# Patient Record
Sex: MALE | Race: BLACK OR AFRICAN AMERICAN | NOT HISPANIC OR LATINO | Employment: FULL TIME | URBAN - METROPOLITAN AREA
[De-identification: names, ages, dates, MRNs, and addresses within clinical notes are randomized per-mention and may not be internally consistent; named-entity substitution may affect disease eponyms.]

---

## 2024-04-13 ENCOUNTER — HOSPITAL ENCOUNTER (EMERGENCY)
Facility: CLINIC | Age: 39
Discharge: HOME | End: 2024-04-13
Attending: EMERGENCY MEDICINE | Admitting: EMERGENCY MEDICINE

## 2024-04-13 VITALS
DIASTOLIC BLOOD PRESSURE: 81 MMHG | HEART RATE: 91 BPM | WEIGHT: 240 LBS | SYSTOLIC BLOOD PRESSURE: 160 MMHG | TEMPERATURE: 98 F | OXYGEN SATURATION: 96 % | RESPIRATION RATE: 16 BRPM

## 2024-04-13 DIAGNOSIS — H00.011 HORDEOLUM EXTERNUM OF RIGHT UPPER EYELID: ICD-10-CM

## 2024-04-13 DIAGNOSIS — H10.9 CONJUNCTIVITIS OF BOTH EYES: ICD-10-CM

## 2024-04-13 DIAGNOSIS — H57.13 EYE PAIN, BILATERAL: ICD-10-CM

## 2024-04-13 PROCEDURE — 250N000009 HC RX 250: Performed by: BEHAVIOR TECHNICIAN

## 2024-04-13 PROCEDURE — 99284 EMERGENCY DEPT VISIT MOD MDM: CPT

## 2024-04-13 RX ORDER — OFLOXACIN 3 MG/ML
1-2 SOLUTION/ DROPS OPHTHALMIC
Qty: 5 ML | Refills: 0 | Status: SHIPPED | OUTPATIENT
Start: 2024-04-13

## 2024-04-13 RX ORDER — KETOROLAC TROMETHAMINE 5 MG/ML
1 SOLUTION OPHTHALMIC 4 TIMES DAILY
Qty: 5 ML | Refills: 0 | Status: SHIPPED | OUTPATIENT
Start: 2024-04-13

## 2024-04-13 RX ORDER — TETRACAINE HYDROCHLORIDE 5 MG/ML
1-2 SOLUTION OPHTHALMIC ONCE
Status: COMPLETED | OUTPATIENT
Start: 2024-04-13 | End: 2024-04-13

## 2024-04-13 RX ADMIN — FLUORESCEIN SODIUM 1 STRIP: 1 STRIP OPHTHALMIC at 16:42

## 2024-04-13 RX ADMIN — TETRACAINE HYDROCHLORIDE 2 DROP: 5 SOLUTION OPHTHALMIC at 16:42

## 2024-04-13 ASSESSMENT — ACTIVITIES OF DAILY LIVING (ADL)
ADLS_ACUITY_SCORE: 35

## 2024-04-13 ASSESSMENT — VISUAL ACUITY
OS: 20/20;WITHOUT CORRECTIVE LENSES
OD: 20/30;WITHOUT CORRECTIVE LENSES
OU: 20/25;WITHOUT CORRECTIVE LENSES
OU: NORMAL

## 2024-04-13 ASSESSMENT — COLUMBIA-SUICIDE SEVERITY RATING SCALE - C-SSRS
2. HAVE YOU ACTUALLY HAD ANY THOUGHTS OF KILLING YOURSELF IN THE PAST MONTH?: NO
6. HAVE YOU EVER DONE ANYTHING, STARTED TO DO ANYTHING, OR PREPARED TO DO ANYTHING TO END YOUR LIFE?: NO
1. IN THE PAST MONTH, HAVE YOU WISHED YOU WERE DEAD OR WISHED YOU COULD GO TO SLEEP AND NOT WAKE UP?: NO

## 2024-04-13 NOTE — ED PROVIDER NOTES
ED ATTENDING PHYSICIAN NOTE:   I evaluated this patient in conjunction with mere romero I have participated in the care of the patient and personally performed key elements of the history, exam, and medical decision making.      HPI:   Gabriel Caldera is a 38 year old male who presents with bilateral eye discomfort and redness he was seen in urgent care about 2 weeks ago and started on Polytrim for conjunctivitis he does not feel much better and now he feels a lump under his right eyelid no fevers chills or active drainage    Independent Historian:   Patient only    Review of External Notes: Urgent care notes     EXAM:   General: Black male sitting no respiratory distress  Head and neck exam: Mild swelling of both upper lids with palpebral conjunctival inflammation.  Small pea size lesion felt under right upper lid.  Minimal conjunctival inflammation.  Extraocular movements intact.  No significant bulbar conjunctival inflammation.  Anterior chamber appears clear.  No foreign body or abrasion seen under slit-lamp    Independent Interpretation (X-rays, CTs, rhythm strip):  None    Consultations/Discussion of Management or Tests:  None    Social Determinants of Health affecting care:   None     MEDICAL DECISION MAKING/ASSESSMENT AND PLAN:   Patient has swelling of both upper eyelids with mild conjunctival inflammation after being on Polytrim he may be having a reaction to Polytrim.  In addition he is probably developing a stye under the right eyelid.  Patient will be placed on fluoroquinolone antibiotic drops given nonsteroidal drops, will apply warm compresses and will follow-up with ophthalmology in the next week.     DIAGNOSIS:     ICD-10-CM    1. Eye pain, bilateral  H57.13       2. Hordeolum externum of right upper eyelid  H00.011       3. Conjunctivitis of both eyes  H10.9                DISPOSITION:   Discharge     Javon Avalos MD  4/13/2024  [facility] EMERGENCY DEPT      [place]iley  Javon Peña MD  04/13/24 2001

## 2024-04-13 NOTE — LETTER
April 13, 2024      To Whom It May Concern:      Gabriel Caldera was seen in our Emergency Department today, 04/13/24.  I expect his condition to improve over the next 3 days.  He may return to work/school 4/17/24 or when improved.    Sincerely,        Carols Cox RN

## 2024-04-13 NOTE — ED PROVIDER NOTES
History     Chief Complaint:  Eye Problem       HPI   Gabriel Caldera is a 38 year old male who presents to the ED for evaluation of bilateral eye pain, redness, swelling.  Patient was seen at urgent care on 4/2/2024 for bilateral conjunctivitis.  He was given 1 week course of Polytrim.  Patient finished antibiotic drops.  However, he states that redness, swelling, discharge, irritation has been gradually worsening despite eyedrops.  He states that right eye seems overall worse than left eye. He denies any blurry vision, fever, upper respiratory symptoms, nausea, vomiting.  He does not wear glasses or contact lenses.       Independent Historian:    Patient only.    Review of External Notes:  Reviewed urgent care note on 4/2/2024.  Seen for bilateral conjunctivitis.  Prescribed Polytrim drops.    Medications:    ketorolac (ACULAR) 0.5 % ophthalmic solution  ofloxacin (OCUFLOX) 0.3 % ophthalmic solution        Past Medical History:    No past medical history on file.    Past Surgical History:    No past surgical history on file.       Physical Exam   Patient Vitals for the past 24 hrs:   BP Temp Temp src Pulse Resp SpO2 Weight   04/13/24 1549 -- -- -- -- -- -- 108.9 kg (240 lb)   04/13/24 1529 (!) 160/81 98  F (36.7  C) Temporal 91 16 96 % --        Physical Exam  Physical Exam:  General: Well appearing, no acute distress  Head: normocephalic, atraumatic  Eyes: Mild conjunctival injection of bilateral eyes with conjunctival inflammation.  Swelling noted of bilateral eyelids, right worse than left.  Small bump noted under right eyelid.  Extraocular muscles are intact.  No proptosis.  No obvious signs of orbital trauma.  No signs of corneal abrasion or foreign body seen on fluorescein slit-lamp exam.  Nose: no signs of bleeding, or drainage  Throat: moist mucous membranes, no erythema, exudate, or drainage of the posterior oropharynx.   CV: RRR, no murmur/gallop/rubs  Pulm: lungs clear to ausculation bilaterally,  normal respiratory effort, normal chest expansion with breathing   Abdomen: soft, non-tender, non-distended, no rigidity or guarding, normal BS  MSK: No cervical tenderness, no midline tenderness  Ext: No gross deformities  Skin: Warm, dry, no rashes  Neuro: A&O x3, normal speech.  No gross deficits  Psych: Appropriate mood. Cooperative      Emergency Department Course   ECG  None    Imaging:  No orders to display       Laboratory:  Labs Ordered and Resulted from Time of ED Arrival to Time of ED Departure - No data to display     Procedures   None    Emergency Department Course & Assessments:    Interventions:  Medications   tetracaine (PONTOCAINE) 0.5 % ophthalmic solution 1-2 drop (2 drops Both Eyes $Given 4/13/24 1642)   fluorescein (FUL-LANDON) ophthalmic strip 1 strip (1 strip Both Eyes $Given 4/13/24 1642)        Assessments:  See ED course    Independent Interpretation (X-rays, CTs, rhythm strip):  None    Consultations/Discussion of Management or Tests:  ED Course as of 04/13/24 1902   Sat Apr 13, 2024   1558 Evaluated patient and obtained history.       Social Determinants of Health affecting care:  None     Disposition:  The patient was discharged.    Impression & Plan      Medical Decision Making:  Patient presents as stated above.  Vitals show elevated blood pressure otherwise normal.  Patient presents with worsening bilateral eye redness, swelling, discharge, irritation.  He was seen at urgent care about 2 weeks ago and diagnosed with bilateral conjunctivitis.  He was prescribed Polytrim.  Patient states that symptoms have been worsening since.  No blurry vision.  No upper respiratory symptoms or fevers.  No nausea or vomiting.  He does not wear glasses or contact lenses.  See further HPI details above.  Differential includes but is not limited to medication reaction, blepharitis, preseptal cellulitis, orbital cellulitis, hyphema.  On exam, he has mild conjunctival injection of bilateral eyes.  He also has  swelling of bilateral eyelids with the right being worse than the left.  I do not appreciate any corneal abrasion, ulceration, foreign body on fluorescein and slit-lamp exams.  Anterior chamber appears normal.  Normal extraocular movements.  No evidence of trauma to the eyeball.  Visual acuity was reassuring.  He had a small bump under right eyelid which looks consistent with a stye.  Do not suspect orbital infection at this time.  Do not think CT imaging is warranted at this time.  Patient was prescribed ofloxacin eyedrops and anti-inflammatory eyedrops.  Discussed follow-up with ophthalmology.  Recommended warm compresses on both eyes several times throughout the day.  Patient is agreeable to plan of care and is okay to discharge at this time.  Return precautions were given.      Diagnosis:    ICD-10-CM    1. Eye pain, bilateral  H57.13       2. Hordeolum externum of right upper eyelid  H00.011       3. Conjunctivitis of both eyes  H10.9            Discharge Medications:  Discharge Medication List as of 4/13/2024  6:05 PM        START taking these medications    Details   ketorolac (ACULAR) 0.5 % ophthalmic solution Place 1 drop into both eyes 4 times daily, Disp-5 mL, R-0, E-Prescribe      ofloxacin (OCUFLOX) 0.3 % ophthalmic solution Place 1-2 drops into both eyes every 2 hours (while awake), Disp-5 mL, R-0, E-Prescribe                Ariel Maldonado PA-C  4/13/2024                Ariel Maldonado PA-C  04/13/24 1902

## 2024-04-13 NOTE — DISCHARGE INSTRUCTIONS
You were seen today for evaluation of bilateral eye pain, redness, and swelling.     What to do next  -Use antibiotic eyedrops 1 to 2 drops in both eyes every 2 hours.  - Use anti-inflammatory eye drops, 1 drop in both eyes 4 time a day.   -Use warm compresses on right eye to help with inflammation.  -Follow-up with ophthalmology for reassessment.  - Return to ER with worsening pain, swelling, redness, blurry vision or fever

## 2024-04-13 NOTE — ED TRIAGE NOTES
Patient was recently seen and treated for conjunctivitis, presenting today with severe swelling and pain in both eyes that he attributes to the medications he was prescribed.      Triage Assessment (Adult)       Row Name 04/13/24 0750          Triage Assessment    Airway WDL WDL        Respiratory WDL    Respiratory WDL WDL        Skin Circulation/Temperature WDL    Skin Circulation/Temperature WDL WDL        Cardiac WDL    Cardiac WDL WDL        Peripheral/Neurovascular WDL    Peripheral Neurovascular WDL WDL        Cognitive/Neuro/Behavioral WDL    Cognitive/Neuro/Behavioral WDL WDL